# Patient Record
Sex: FEMALE | Race: BLACK OR AFRICAN AMERICAN | Employment: UNEMPLOYED | URBAN - METROPOLITAN AREA
[De-identification: names, ages, dates, MRNs, and addresses within clinical notes are randomized per-mention and may not be internally consistent; named-entity substitution may affect disease eponyms.]

---

## 2024-03-12 ENCOUNTER — OFFICE VISIT (OUTPATIENT)
Dept: FAMILY MEDICINE | Facility: CLINIC | Age: 74
End: 2024-03-12
Payer: COMMERCIAL

## 2024-03-12 VITALS
HEART RATE: 88 BPM | DIASTOLIC BLOOD PRESSURE: 81 MMHG | OXYGEN SATURATION: 98 % | RESPIRATION RATE: 18 BRPM | SYSTOLIC BLOOD PRESSURE: 136 MMHG | WEIGHT: 235 LBS | TEMPERATURE: 98.6 F

## 2024-03-12 DIAGNOSIS — H11.31 SUBCONJUNCTIVAL HEMORRHAGE OF RIGHT EYE: ICD-10-CM

## 2024-03-12 DIAGNOSIS — S05.01XA ABRASION OF RIGHT CORNEA, INITIAL ENCOUNTER: Primary | ICD-10-CM

## 2024-03-12 PROCEDURE — 99203 OFFICE O/P NEW LOW 30 MIN: CPT

## 2024-03-12 RX ORDER — INSULIN GLARGINE 100 [IU]/ML
20 INJECTION, SOLUTION SUBCUTANEOUS
COMMUNITY
Start: 2023-12-13

## 2024-03-12 RX ORDER — OFLOXACIN 3 MG/ML
1-2 SOLUTION/ DROPS OPHTHALMIC
Qty: 10 ML | Refills: 0 | Status: SHIPPED | OUTPATIENT
Start: 2024-03-12

## 2024-03-12 RX ORDER — METFORMIN HCL 500 MG
500 TABLET, EXTENDED RELEASE 24 HR ORAL
COMMUNITY
Start: 2023-12-13

## 2024-03-12 NOTE — PROGRESS NOTES
URGENT CARE  Assessment & Plan   Assessment:   Dasha Rodríguez is a 74 year old female who's clinical presentation today is consistent with:   1. Abrasion of right cornea, initial encounter  - ofloxacin (OCUFLOX) 0.3 % ophthalmic solution;   Plan:  Will treat patient's corneal abrasion with topical antibiotic eye drops  Foreign body removal attempted but not needed as no foreign body seen under magnification or with lid eversion   Encouraged patient for pain they should use Ibuprofen and/or tylenol as needed. Additionally a cool, moist compresses over the affected eye, as needed may also help relieve the discomfort   Encouraged patient to follow up in 24 hours with PCP or opthalmology specialty if symptoms do not improve or if symptoms worsen     2. Subconjunctival hemorrhage of right eye  Plan:  will treat patient's subconjunctival hemorrhage today with supportive and symptomatic measures. Which will include resting and close monitoring.   Educated patient that this condition is self-limiting and will resolve w/o treatment over the next 2-3 weeks, however we discussed if symptoms do not improve (or if symptoms worsen) to follow up in 2 weeks  Encouraged patient to monitor for increased pain, vision changes, increased pressure and to monitor if hemorrhage is improving over time, educated patient to return should any of these occur.   No alarm signs or symptoms present   Differential Diagnoses for this patient's chief complaint that I considered include:  Conjunctivitis: bacterial vs viral or allergic, foreign body, Dry eye, blepharitis, corneal ulcer, keratitis, uveitis/iritis, acute angle glaucoma,     Patient is} agreeable to treatment plan and state they will follow-up if symptoms do not improve and/or if symptoms worsen   see patient's AVS 'monitor for' section for specific patient instructions given and discussed regarding what to watch for and when to follow up    KAREN Landrum [facility]  [facility]      ______________________________________________________________________      Subjective     HPI: Dasha Rodríguez  is a 74 year old  female who presents today for evaluation the following concerns:   Pt presents today endorsing  spontaneously blood in the right eye . Pt states they noticed it today.   Pt endorses a history of sneezing very hard and thinks that may have caused it    Pt denies any vision changes, or blurry vision. However patient states she thought something was in her eye and she used a scarf to clear it away, after that she had intense pain and she think she scratched it, since then it has been tearing and sensitive to light     Review of Systems:  Pertinent review of systems as reflected in HPI, otherwise negative.     Objective    Physical Exam:  Vitals:    03/12/24 1833   BP: 136/81   Pulse: 88   Resp: 18   Temp: 98.6  F (37  C)   TempSrc: Oral   SpO2: 98%   Weight: 106.6 kg (235 lb)      General:   alert and oriented, no acute distress, non}ill-appearing   Vital signs reviewed: afebrile and normotensive     Psy/mental status: pleasant   EYE:   PERRLA, EOMI   Well circumscribed area of confluent hemorrhage underneath conjunctiva noted on the inner aspect of the right eye.    No visual acuity changes noted, no blur vision noted   Tearing, photophobia noted     Procedure:   After obtaining verbal informed consent and discussing risks and benefits of procedure, numbing drops (proparacaine) were instilled into right eye, followed by fluorescein solution.   Woods lamp used, to asses for corneal abrasion:                  Fluorescein uptake was  noted in the cornea on the inner aspect of the eye , at 4 o'clock of the iris   Additionally no  foreign bodies were present or were visualized upon magnification and with everting upper and lower lid   Patient tolerated procedure well and reports feeling relief of  post  procedure      ______________________________________________________________________    I explained my diagnostic considerations and recommendations to the patient, who voiced understanding and agreement with the treatment plan.   All questions were answered.   We discussed potential side effects, risks and benefits of any prescribed or recommended therapies, as well as expectations for response to treatments.  Please see AVS for any patient instructions & handouts given.   Patient was advised to contact the Nurse Care Line, their Primary Care provider, Urgent Care, or the Emergency Department if there are new or worsening symptoms, or call 911 for emergencies.

## 2024-03-12 NOTE — PATIENT INSTRUCTIONS
Diagnosis: corneal abrasion   Today:   The injured eye was treated with numbing drops, then checked for scratches and rinsed.  Scratch noted: inner side of iris    Foreign bodies  not noted}    Plan:   Antibiotic eye drops/ointment to help eye heal and prevent infection    Ointment can cause blurry vision, this is normal and expected   Shield eye from direct sunlight   Avoid rubbing as this slows the healing   Patching is not recommended     Monitor for:   Monitor for signs of worsening infection  Purulent drainage,   Swelling   Eye does not appear to be getting better or is increasing in pain   Changes in visual acuity or blurry vision   Headaches   Fevers, chills   Inability to open eye        Corneal Abrasion   The cornea is the clear part in front of the eye. If the cornea becomes scratched, the injury is called a corneal abrasion.   Corneal abrasions cause: severe eye pain, inability to open the eye, blurred vision, watery eyes, and sensitivity to light.   The eye may become red and swollen.  A corneal abrasion can occur when something gets into the eye, such as dirt or sand,. Or it can happen if a fingernail or other object pokes or scratches the eye.  Superficial corneal injuries usually heals overnight, at most in 2-3 days   The eye is considered healed if you are able to keep it open and it seems pain free    Deeper corneal injuries may take longer to heal.          Diagnosis: subconjunctival hemorrhage   Plan:   No signs of serious eye injury  Collected blood will resolve on its own over a few days to weeks   Should remain painless   Can refer to opthalmology if not getting better     Monitor for:   Pain in the eye  Change in vision  The blood does not go away within 3 weeks  Increasing redness or swelling of the eye  Severe headache or dizziness  Signs of bruising or bleeding from other parts of your body      Subconjunctival Hemorrhage  A subconjunctival hemorrhage is a result of a broken blood vessel in  the white part of the eye.   It is usually painless. It may be caused by coughing, sneezing, or vomiting.   An injury to the eye can cause this condition, too.   It can also be a sign of high blood pressure (hypertension) or a bleeding disorder.  This eye problem can look scary. But the presence of the blood is not serious. It will be reabsorbed without treatment within a few days to a few weeks.   It's similar to a bruise on the skin. This type of hemorrhage is common. It can look quite alarming, but it's usually harmless.  The sclera is the white part of the eye that lies beneath the conjunctiva.   Sometimes a blood vessel in the conjunctiva breaks open and bleeds. The blood then collects under the conjunctiva and turns part of the eye red.   Over several weeks, your body then absorbs the blood.  Sometimes the cause isn't known. But some health conditions may make it more likely.   These include:  Eye injury, Eye surgery, Violent sneezing, coughing, or vomiting  High blood pressure, Pushing hard during childbirth, Straining during constipation  Contact lens use, Diabetes, Inflammation of the conjunctiva  Arteriosclerosis, Tumor of the conjunctiva  Diseases that affect blood clotting, Certain medicines that can increase bleeding, such as aspirin or blood thinners

## 2025-04-08 ENCOUNTER — TELEPHONE (OUTPATIENT)
Dept: FAMILY MEDICINE | Facility: CLINIC | Age: 75
End: 2025-04-08

## 2025-04-08 NOTE — TELEPHONE ENCOUNTER
Prior Authorization Retail Medication Request    Medication/Dose: Ozempic 0.25 or .5 2mg/3ml  Diagnosis and ICD code (if different than what is on RX):    New/renewal/insurance change PA/secondary ins. PA:  Previously Tried and Failed:    Rationale:      Insurance   Primary: [phone]  Insurance ID:  [id number]      Clinic Information  Preferred routing pool for dept communication: [facility]

## 2025-04-09 NOTE — TELEPHONE ENCOUNTER
Prior Authorization Approval    Medication: SEMAGLUTIDE(0.25 OR 0.5MG/DOS) 2 MG/3ML SC SOPN  Authorization Effective Date: 4/9/2025  Authorization Expiration Date: 4/9/2026  Approved Dose/Quantity:   Reference #:     Insurance Company: [employer] - Phone [phone] Fax [phone]  Expected CoPay: $    CoPay Card Available:      Financial Assistance Needed:   Which Pharmacy is filling the prescription: [id number] - [place], [place] - [address] [place] [address] AT [facility]

## 2025-05-28 DIAGNOSIS — E03.9 HYPOTHYROIDISM, UNSPECIFIED TYPE: ICD-10-CM

## 2025-05-28 RX ORDER — LEVOTHYROXINE SODIUM 75 UG/1
75 TABLET ORAL
Qty: 30 TABLET | OUTPATIENT
Start: 2025-05-28

## 2025-06-24 DIAGNOSIS — E11.9 TYPE 2 DIABETES MELLITUS WITHOUT COMPLICATION, WITH LONG-TERM CURRENT USE OF INSULIN (H): ICD-10-CM

## 2025-06-24 DIAGNOSIS — E78.5 HYPERLIPIDEMIA LDL GOAL <100: ICD-10-CM

## 2025-06-24 DIAGNOSIS — Z79.4 TYPE 2 DIABETES MELLITUS WITHOUT COMPLICATION, WITH LONG-TERM CURRENT USE OF INSULIN (H): ICD-10-CM

## 2025-06-24 RX ORDER — METFORMIN HYDROCHLORIDE 500 MG/1
500 TABLET, EXTENDED RELEASE ORAL 2 TIMES DAILY WITH MEALS
Qty: 60 TABLET | Refills: 0 | Status: SHIPPED | OUTPATIENT
Start: 2025-06-24 | End: 2025-07-01

## 2025-06-24 RX ORDER — SIMVASTATIN 20 MG
20 TABLET ORAL AT BEDTIME
Qty: 30 TABLET | Refills: 0 | Status: SHIPPED | OUTPATIENT
Start: 2025-06-24 | End: 2025-07-01

## 2025-07-01 ENCOUNTER — OFFICE VISIT (OUTPATIENT)
Dept: FAMILY MEDICINE | Facility: CLINIC | Age: 75
End: 2025-07-01
Payer: COMMERCIAL

## 2025-07-01 ENCOUNTER — RESULTS FOLLOW-UP (OUTPATIENT)
Dept: FAMILY MEDICINE | Facility: CLINIC | Age: 75
End: 2025-07-01

## 2025-07-01 VITALS
OXYGEN SATURATION: 99 % | TEMPERATURE: 97.9 F | BODY MASS INDEX: 40.07 KG/M2 | SYSTOLIC BLOOD PRESSURE: 107 MMHG | DIASTOLIC BLOOD PRESSURE: 75 MMHG | WEIGHT: 204.08 LBS | HEIGHT: 60 IN | HEART RATE: 86 BPM | RESPIRATION RATE: 16 BRPM

## 2025-07-01 DIAGNOSIS — R03.0 ELEVATED BLOOD PRESSURE READING WITHOUT DIAGNOSIS OF HYPERTENSION: ICD-10-CM

## 2025-07-01 DIAGNOSIS — R12 HEARTBURN: ICD-10-CM

## 2025-07-01 DIAGNOSIS — E66.813 CLASS 3 OBESITY (H): ICD-10-CM

## 2025-07-01 DIAGNOSIS — E78.5 HYPERLIPIDEMIA LDL GOAL <100: ICD-10-CM

## 2025-07-01 DIAGNOSIS — E11.9 TYPE 2 DIABETES MELLITUS WITHOUT COMPLICATION, WITH LONG-TERM CURRENT USE OF INSULIN (H): Primary | ICD-10-CM

## 2025-07-01 DIAGNOSIS — Z79.4 TYPE 2 DIABETES MELLITUS WITHOUT COMPLICATION, WITH LONG-TERM CURRENT USE OF INSULIN (H): Primary | ICD-10-CM

## 2025-07-01 LAB
EST. AVERAGE GLUCOSE BLD GHB EST-MCNC: 148 MG/DL
HBA1C MFR BLD: 6.8 % (ref 0–5.6)

## 2025-07-01 RX ORDER — INSULIN GLARGINE 100 [IU]/ML
20 INJECTION, SOLUTION SUBCUTANEOUS AT BEDTIME
Qty: 15 ML | Refills: 2 | Status: SHIPPED | OUTPATIENT
Start: 2025-07-01 | End: 2025-07-01

## 2025-07-01 RX ORDER — INSULIN GLARGINE 100 [IU]/ML
16 INJECTION, SOLUTION SUBCUTANEOUS AT BEDTIME
Qty: 15 ML | Refills: 2 | Status: SHIPPED | OUTPATIENT
Start: 2025-07-01

## 2025-07-01 RX ORDER — METFORMIN HYDROCHLORIDE 500 MG/1
1000 TABLET, EXTENDED RELEASE ORAL 2 TIMES DAILY WITH MEALS
Qty: 360 TABLET | Refills: 3 | Status: SHIPPED | OUTPATIENT
Start: 2025-07-01 | End: 2025-07-01

## 2025-07-01 RX ORDER — METFORMIN HYDROCHLORIDE 500 MG/1
500 TABLET, EXTENDED RELEASE ORAL 2 TIMES DAILY WITH MEALS
Qty: 360 TABLET | Refills: 3 | Status: SHIPPED | OUTPATIENT
Start: 2025-07-01

## 2025-07-01 RX ORDER — CALCIUM CARBONATE 500 MG/1
1 TABLET, CHEWABLE ORAL 2 TIMES DAILY PRN
Qty: 90 TABLET | Refills: 0 | Status: SHIPPED | OUTPATIENT
Start: 2025-07-01

## 2025-07-01 RX ORDER — SIMVASTATIN 20 MG
20 TABLET ORAL AT BEDTIME
Qty: 90 TABLET | Refills: 3 | Status: SHIPPED | OUTPATIENT
Start: 2025-07-01 | End: 2025-07-02

## 2025-07-01 NOTE — PROGRESS NOTES
Preceptor Attestation:   Patient seen, evaluated and discussed with the resident. I have verified the content of the note, which accurately reflects my assessment of the patient and the plan of care.    Supervising Physician:Yesi Brock MD    [facility]

## 2025-07-01 NOTE — PROGRESS NOTES
Assessment & Plan   Dasha Rodríguez is 75 year old female who recently established care with our clinic. She is here today to follow up on her type II diabetes.     Type II Diabetes   Obesity   Most recent A1c: 11.8 on 4/3/2025 in the setting of being out of medications.   A1c today: significant improved to  6.8 -- congratulated the patient on this improvement!   Current recommended regimen: insulin lantus 20 units HS + metformin 500 mg BID + Ozempic 0.5 mg weekly. She is tolerating these mediations without any concerns. She would like to increase Ozempic dosing to continue to support her weight loss. In the setting of increasing Ozempic and A1c being at goal, will decrease lantus to mitigate risk of hypoglycemia.   - A1c   - Continue metformin 500 mg BID   - Increase Ozempic to 1 mg weekly   - Decrease lantus to 16 units HS    - RTC in 3 months     Hyperlipidemia   Noted to have  on lipid panel 4/3/25. Restarted on simvastatin 20 mg daily at that time. Repeat lipid panel today not yet at goal of LDL less than 100.   - Increase to simvastatin 40 mg daily     Elevated Blood Pressure without Hypertension - resolved   Blood pressure noted to be elevated at last visit, no history of hypertension otherwise. Today blood pressure is non elevated, no further work up indicated.     Heartburn   - Trial Tums PRN     The longitudinal plan of care for the diagnosis(es)/condition(s) as documented were addressed during this visit. Due to the added complexity in care, I will continue to support Dasha in the subsequent management and with ongoing continuity of care.    Subjective   Dasha is a 75 year old, presenting for the following health issues:  Follow Up (Diabetes follow up. ), Recheck Medication, and Refill Request (Refill medication )    HPI    The patient is very pleasant and cooperative.  She is able to tell me her full medication regimen, has been compliant up until a few days ago when she ran out.  Is  "requesting all medicines be refilled now.  She has been tolerating Ozempic 0.5 mg weekly now for 4 weeks without concern.  Would like to increase to 1 mg weekly.  She has been tolerating metformin without any side effects.  Also interested/agreeable to increasing this.    Blood pressure controlled today, no indication to start any antihypertensive.    She has occasional heartburn symptoms, requesting as needed medication for this.    ROS negative aside from those concerns noted in the HPI and A+P above.         Objective    /75   Pulse 86   Temp 97.9  F (36.6  C) (Oral)   Resp 16   Ht 1.525 m (5' 0.04\")   Wt 92.6 kg (204 lb 1.3 oz)   SpO2 99%   BMI 39.80 kg/m    Body mass index is 39.8 kg/m .  Physical Exam   General: Sitting comfortably. No acute distress. Pleasant and cooperative.   HEENT: Conjunctivae are clear, nonicteric.   Respiratory: No respiratory distress.  Cardiac:regular rate on viral review.   Skin: Visible skin warm without rashes.  Neurological: Motor function is grossly normal.  Psychiatric: Good insight. Engaged in care conversation.       Signed Electronically by: Alexandra Cavanaugh MD    "

## 2025-07-02 LAB
CHOLEST SERPL-MCNC: 226 MG/DL
FASTING STATUS PATIENT QL REPORTED: ABNORMAL
HDLC SERPL-MCNC: 52 MG/DL
LDLC SERPL CALC-MCNC: 162 MG/DL
NONHDLC SERPL-MCNC: 174 MG/DL
TRIGL SERPL-MCNC: 60 MG/DL

## 2025-07-02 RX ORDER — SIMVASTATIN 40 MG
40 TABLET ORAL AT BEDTIME
Qty: 90 TABLET | Refills: 3 | Status: SHIPPED | OUTPATIENT
Start: 2025-07-02